# Patient Record
Sex: FEMALE | Race: WHITE | NOT HISPANIC OR LATINO | Employment: FULL TIME | URBAN - METROPOLITAN AREA
[De-identification: names, ages, dates, MRNs, and addresses within clinical notes are randomized per-mention and may not be internally consistent; named-entity substitution may affect disease eponyms.]

---

## 2017-02-01 ENCOUNTER — OFFICE VISIT (OUTPATIENT)
Dept: URGENT CARE | Facility: PHYSICIAN GROUP | Age: 62
End: 2017-02-01
Payer: COMMERCIAL

## 2017-02-01 VITALS
TEMPERATURE: 98.1 F | OXYGEN SATURATION: 98 % | SYSTOLIC BLOOD PRESSURE: 100 MMHG | WEIGHT: 136 LBS | BODY MASS INDEX: 21.96 KG/M2 | DIASTOLIC BLOOD PRESSURE: 58 MMHG | HEART RATE: 68 BPM

## 2017-02-01 DIAGNOSIS — R11.0 NAUSEA IN ADULT: ICD-10-CM

## 2017-02-01 DIAGNOSIS — N30.00 ACUTE CYSTITIS WITHOUT HEMATURIA: ICD-10-CM

## 2017-02-01 LAB
APPEARANCE UR: NORMAL
BILIRUB UR STRIP-MCNC: NORMAL MG/DL
COLOR UR AUTO: YELLOW
GLUCOSE UR STRIP.AUTO-MCNC: NORMAL MG/DL
KETONES UR STRIP.AUTO-MCNC: NORMAL MG/DL
LEUKOCYTE ESTERASE UR QL STRIP.AUTO: NORMAL
NITRITE UR QL STRIP.AUTO: NORMAL
PH UR STRIP.AUTO: 5 [PH] (ref 5–8)
PROT UR QL STRIP: NORMAL MG/DL
RBC UR QL AUTO: NORMAL
SP GR UR STRIP.AUTO: 1.02
UROBILINOGEN UR STRIP-MCNC: NORMAL MG/DL

## 2017-02-01 PROCEDURE — 99203 OFFICE O/P NEW LOW 30 MIN: CPT | Performed by: NURSE PRACTITIONER

## 2017-02-01 PROCEDURE — 81002 URINALYSIS NONAUTO W/O SCOPE: CPT | Performed by: NURSE PRACTITIONER

## 2017-02-01 RX ORDER — ONDANSETRON 4 MG/1
4 TABLET, FILM COATED ORAL EVERY 6 HOURS PRN
Qty: 12 TAB | Refills: 0 | Status: SHIPPED | OUTPATIENT
Start: 2017-02-01

## 2017-02-01 RX ORDER — NITROFURANTOIN 25; 75 MG/1; MG/1
100 CAPSULE ORAL 2 TIMES DAILY
Qty: 14 CAP | Refills: 0 | Status: SHIPPED | OUTPATIENT
Start: 2017-02-01

## 2017-02-01 RX ORDER — TIMOLOL MALEATE 2.5 MG/ML
1 SOLUTION OPHTHALMIC DAILY
COMMUNITY

## 2017-02-01 ASSESSMENT — ENCOUNTER SYMPTOMS
BACK PAIN: 0
WEAKNESS: 0
FEVER: 0
CHILLS: 0
SORE THROAT: 0
MYALGIAS: 0
ABDOMINAL PAIN: 1
HEADACHES: 0
VOMITING: 0
FLANK PAIN: 0
COUGH: 0
NAUSEA: 1

## 2017-02-01 NOTE — PATIENT INSTRUCTIONS
Urinary Tract Infection  Urinary tract infections (UTIs) can develop anywhere along your urinary tract. Your urinary tract is your body's drainage system for removing wastes and extra water. Your urinary tract includes two kidneys, two ureters, a bladder, and a urethra. Your kidneys are a pair of bean-shaped organs. Each kidney is about the size of your fist. They are located below your ribs, one on each side of your spine.  CAUSES  Infections are caused by microbes, which are microscopic organisms, including fungi, viruses, and bacteria. These organisms are so small that they can only be seen through a microscope. Bacteria are the microbes that most commonly cause UTIs.  SYMPTOMS   Symptoms of UTIs may vary by age and gender of the patient and by the location of the infection. Symptoms in young women typically include a frequent and intense urge to urinate and a painful, burning feeling in the bladder or urethra during urination. Older women and men are more likely to be tired, shaky, and weak and have muscle aches and abdominal pain. A fever may mean the infection is in your kidneys. Other symptoms of a kidney infection include pain in your back or sides below the ribs, nausea, and vomiting.  DIAGNOSIS  To diagnose a UTI, your caregiver will ask you about your symptoms. Your caregiver also will ask to provide a urine sample. The urine sample will be tested for bacteria and white blood cells. White blood cells are made by your body to help fight infection.  TREATMENT   Typically, UTIs can be treated with medication. Because most UTIs are caused by a bacterial infection, they usually can be treated with the use of antibiotics. The choice of antibiotic and length of treatment depend on your symptoms and the type of bacteria causing your infection.  HOME CARE INSTRUCTIONS  · If you were prescribed antibiotics, take them exactly as your caregiver instructs you. Finish the medication even if you feel better after you  have only taken some of the medication.  · Drink enough water and fluids to keep your urine clear or pale yellow.  · Avoid caffeine, tea, and carbonated beverages. They tend to irritate your bladder.  · Empty your bladder often. Avoid holding urine for long periods of time.  · Empty your bladder before and after sexual intercourse.  · After a bowel movement, women should cleanse from front to back. Use each tissue only once.  SEEK MEDICAL CARE IF:   · You have back pain.  · You develop a fever.  · Your symptoms do not begin to resolve within 3 days.  SEEK IMMEDIATE MEDICAL CARE IF:   · You have severe back pain or lower abdominal pain.  · You develop chills.  · You have nausea or vomiting.  · You have continued burning or discomfort with urination.  MAKE SURE YOU:   · Understand these instructions.  · Will watch your condition.  · Will get help right away if you are not doing well or get worse.     This information is not intended to replace advice given to you by your health care provider. Make sure you discuss any questions you have with your health care provider.     Document Released: 09/27/2006 Document Revised: 01/08/2016 Document Reviewed: 01/25/2013  [employer] Interactive Patient Education ©2016 [employer].      Nausea, Adult  Nausea is the feeling that you have an upset stomach or have to vomit. Nausea by itself is not likely a serious concern, but it may be an early sign of more serious medical problems. As nausea gets worse, it can lead to vomiting. If vomiting develops, there is the risk of dehydration.   CAUSES   · Viral infections.  · Food poisoning.  · Medicines.  · Pregnancy.  · Motion sickness.  · Migraine headaches.  · Emotional distress.  · Severe pain from any source.  · Alcohol intoxication.  HOME CARE INSTRUCTIONS  · Get plenty of rest.  · Ask your caregiver about specific rehydration instructions.  · Eat small amounts of food and sip liquids more often.  · Take all medicines as told by your  caregiver.  SEEK MEDICAL CARE IF:  · You have not improved after 2 days, or you get worse.  · You have a headache.  SEEK IMMEDIATE MEDICAL CARE IF:   · You have a fever.  · You faint.  · You keep vomiting or have blood in your vomit.  · You are extremely weak or dehydrated.  · You have dark or bloody stools.  · You have severe chest or abdominal pain.  MAKE SURE YOU:  · Understand these instructions.  · Will watch your condition.  · Will get help right away if you are not doing well or get worse.     This information is not intended to replace advice given to you by your health care provider. Make sure you discuss any questions you have with your health care provider.     Document Released: 01/25/2006 Document Revised: 01/08/2016 Document Reviewed: 08/29/2012  [employer] Interactive Patient Education ©2016 [employer]

## 2017-02-01 NOTE — PROGRESS NOTES
Subjective:      Ngoc Carranza is a 61 y.o. female who presents with Nausea            HPI Comments: Medications, Allergies and Prior Medical Hx reviewed and updated in [facility].with patient/family today     Pt with onset 5 days ago of nausea and abd cramping increasing with eating.  Denies vomiting, diarrhea or constipation.  Denies fever or chills.  Pt also c/o frequency, urgency, cloudy and malodorus urine    Nausea  This is a new problem. The current episode started in the past 7 days (5 days). The problem has been unchanged. Associated symptoms include abdominal pain and nausea. Pertinent negatives include no chills, congestion, coughing, fever, headaches, myalgias, sore throat, vomiting or weakness. The symptoms are aggravated by eating. She has tried nothing for the symptoms. The treatment provided no relief.       Review of Systems   Constitutional: Negative for fever, chills and malaise/fatigue.   HENT: Negative for congestion and sore throat.    Respiratory: Negative for cough.    Gastrointestinal: Positive for nausea and abdominal pain. Negative for vomiting.   Genitourinary: Positive for urgency and frequency. Negative for dysuria, hematuria and flank pain.   Musculoskeletal: Negative for myalgias and back pain.   Neurological: Negative for weakness and headaches.          Objective:     /58 mmHg  Pulse 68  Temp(Src) 36.7 °C (98.1 °F)  Wt 61.689 kg (136 lb)  SpO2 98%     Physical Exam   Constitutional: She appears well-developed and well-nourished. No distress.   HENT:   Head: Normocephalic and atraumatic.   Eyes: Conjunctivae are normal. Pupils are equal, round, and reactive to light.   Neck: Neck supple.   Cardiovascular: Normal rate, regular rhythm and normal heart sounds.    Pulmonary/Chest: Effort normal and breath sounds normal. No respiratory distress.   Abdominal: Soft. Bowel sounds are normal. She exhibits no distension and no fluid wave. There is tenderness. There is no rigidity,  no guarding, no CVA tenderness, no tenderness at McBurney's point and negative Jaimes's sign.   Diffuse tenderness mainly in lower abd.    Musculoskeletal: She exhibits no edema.   Neurological: She is alert.   Awake, alert, answering questions appropriately, moving all extremeties   Skin: Skin is warm and dry.   Psychiatric: She has a normal mood and affect. Her behavior is normal.   Vitals reviewed.              Assessment/Plan:       1. Nausea in adult  ondansetron (ZOFRAN) 4 MG Tab tablet   2. Acute cystitis without hematuria  nitrofurantoin monohydr macro (MACROBID) 100 MG Cap           Poct UA positive for leuks and blood    Drink fluids  Discussed with pt will start with treating uti, if abd sx do not improve in the next 2-3 days or worsen to return for recheck.   Pt will go to the ER for worsening or changing symptoms as discusse, high fever, body aches, increasing abd pain  vomiting, flank pain  Follow-up with your primary care provider or return here if not improving in 3 days   Discharge instructions discussed with pt/family who verbalize understanding and agreement with poc

## 2019-03-19 ENCOUNTER — OFFICE VISIT (OUTPATIENT)
Dept: URGENT CARE | Facility: PHYSICIAN GROUP | Age: 64
End: 2019-03-19
Payer: COMMERCIAL

## 2019-03-19 VITALS
TEMPERATURE: 98.1 F | SYSTOLIC BLOOD PRESSURE: 104 MMHG | RESPIRATION RATE: 16 BRPM | BODY MASS INDEX: 22.98 KG/M2 | HEIGHT: 66 IN | HEART RATE: 68 BPM | OXYGEN SATURATION: 97 % | DIASTOLIC BLOOD PRESSURE: 62 MMHG | WEIGHT: 143 LBS

## 2019-03-19 DIAGNOSIS — H61.22 IMPACTED CERUMEN OF LEFT EAR: ICD-10-CM

## 2019-03-19 PROCEDURE — 69210 REMOVE IMPACTED EAR WAX UNI: CPT | Performed by: FAMILY MEDICINE

## 2019-07-16 ENCOUNTER — HOSPITAL ENCOUNTER (OUTPATIENT)
Facility: MEDICAL CENTER | Age: 64
End: 2019-07-16
Attending: OBSTETRICS & GYNECOLOGY
Payer: COMMERCIAL

## 2019-07-16 ENCOUNTER — GYNECOLOGY VISIT (OUTPATIENT)
Dept: OBGYN | Facility: CLINIC | Age: 64
End: 2019-07-16
Payer: COMMERCIAL

## 2019-07-16 VITALS — DIASTOLIC BLOOD PRESSURE: 60 MMHG | BODY MASS INDEX: 23.73 KG/M2 | SYSTOLIC BLOOD PRESSURE: 100 MMHG | WEIGHT: 147 LBS

## 2019-07-16 DIAGNOSIS — Z12.39 BREAST CANCER SCREENING: ICD-10-CM

## 2019-07-16 DIAGNOSIS — Z12.4 CERVICAL CANCER SCREENING: ICD-10-CM

## 2019-07-16 DIAGNOSIS — N39.46 MIXED STRESS AND URGE URINARY INCONTINENCE: ICD-10-CM

## 2019-07-16 DIAGNOSIS — Z01.419 ENCNTR FOR GYN EXAM (GENERAL) (ROUTINE) W/O ABN FINDINGS: ICD-10-CM

## 2019-07-16 DIAGNOSIS — Z78.0 POSTMENOPAUSAL: ICD-10-CM

## 2019-07-16 LAB
APPEARANCE UR: ABNORMAL
BILIRUB UR STRIP-MCNC: ABNORMAL MG/DL
COLOR UR AUTO: ABNORMAL
GLUCOSE UR STRIP.AUTO-MCNC: NEGATIVE MG/DL
KETONES UR STRIP.AUTO-MCNC: NEGATIVE MG/DL
LEUKOCYTE ESTERASE UR QL STRIP.AUTO: ABNORMAL
NITRITE UR QL STRIP.AUTO: NEGATIVE
PH UR STRIP.AUTO: 8.5 [PH] (ref 5–8)
PROT UR QL STRIP: NEGATIVE MG/DL
RBC UR QL AUTO: NEGATIVE
SP GR UR STRIP.AUTO: 1.01
UROBILINOGEN UR STRIP-MCNC: ABNORMAL MG/DL

## 2019-07-16 PROCEDURE — 87077 CULTURE AEROBIC IDENTIFY: CPT

## 2019-07-16 PROCEDURE — 87186 SC STD MICRODIL/AGAR DIL: CPT

## 2019-07-16 PROCEDURE — 99386 PREV VISIT NEW AGE 40-64: CPT | Performed by: OBSTETRICS & GYNECOLOGY

## 2019-07-16 PROCEDURE — 81002 URINALYSIS NONAUTO W/O SCOPE: CPT | Performed by: OBSTETRICS & GYNECOLOGY

## 2019-07-16 PROCEDURE — 87086 URINE CULTURE/COLONY COUNT: CPT

## 2019-07-16 PROCEDURE — 88142 CYTOPATH C/V THIN LAYER: CPT

## 2019-07-16 RX ORDER — ZINC SULFATE 50(220)MG
220 CAPSULE ORAL DAILY
COMMUNITY

## 2019-07-16 NOTE — PROGRESS NOTES
Subjective:      Ngoc Carranza is a 64 y.o. female who presents for Annual Exam            HPI patient is a 64-year-old  0 who presents today for annual gynecologic exam.  Patient reports symptoms of stress and urge incontinence-predominantly urgency.  She reports rare stress incontinence symptoms.  She also reports very malodorous urine for the past few months.  Patient states 1 of her thyroid medication was changed and she has noticed a change in smell of her urine since.  She denies any pelvic pain vaginal bleeding or discharge.    Patient had history of colon cancer and underwent colon resection reportedly 18 cm resection along with 6 months of chemotherapy in .  She undergoes routine colon cancer evaluation and colonoscopy.    Patient reports no history of abnormal Pap smears PID or STDs.  Last Pap smear was more than 10 years ago.  Patient states she had normal mammogram to her PCP earlier this year.    Patient has no other concerns currently.  She cannot recall when she went to menopause.  She has no menopausal symptoms.  No history of HRT    ROS all organ systems were reviewed and were negative except for complaints in HPI       Objective:     /60   Wt 66.7 kg (147 lb)   BMI 23.73 kg/m²      Physical Exam   Constitutional: She is oriented to person, place, and time. She appears well-developed and well-nourished.   HENT:   Head: Normocephalic and atraumatic.   Eyes: Pupils are equal, round, and reactive to light. Conjunctivae are normal. Right eye exhibits no discharge. Left eye exhibits no discharge.   Neck: Normal range of motion. Neck supple. No tracheal deviation present. No thyromegaly present.   Cardiovascular: Normal rate, regular rhythm and normal heart sounds.    Pulmonary/Chest: Effort normal and breath sounds normal. No respiratory distress. She has no wheezes. Right breast exhibits no inverted nipple, no mass, no nipple discharge, no skin change and no tenderness. Left  breast exhibits no inverted nipple, no mass, no nipple discharge, no skin change and no tenderness.   Abdominal: Soft. Bowel sounds are normal. She exhibits no distension. There is no tenderness. There is no guarding.   Old vertical abdominal scar noted   Genitourinary: There is no rash, tenderness, lesion or injury on the right labia. There is no rash, tenderness, lesion or injury on the left labia. Uterus is not enlarged and not tender. Cervix exhibits no motion tenderness, no discharge and no friability. Right adnexum displays no mass, no tenderness and no fullness. Left adnexum displays no mass, no tenderness and no fullness.   Musculoskeletal: Normal range of motion. She exhibits no edema or deformity.   Neurological: She is alert and oriented to person, place, and time. No cranial nerve deficit. Coordination normal.   Skin: Skin is warm and dry. No rash noted. She is not diaphoretic. No erythema.   Psychiatric: She has a normal mood and affect. Her behavior is normal. Thought content normal.   Nursing note and vitals reviewed.              Assessment/Plan:     1. Encntr for gyn exam (general) (routine) w/o abn findings  64-year-old postmenopausal female here for annual gynecologic exam.  Exam is within normal limits    2. Postmenopausal  Patient is asymptomatic    3. Mixed stress and urge urinary incontinence  We discussed symptoms and treatment options including pelvic floor therapy and medication.  We discussed behavioral modification.  Patient desires to try pelvic floor therapy if her urine culture is negative.  Referral sent    4. Cervical cancer screening  Pap smear obtained.  Screening recommendations discussed  - THINPREP PAP WITH HPV; Future    5. Breast cancer screening  Breast cancer screening recommendations reviewed including clinical breast exams, self breast exams and annual mammograms.  Patient is up-to-date with mammogram    Precautions and plan of care reviewed

## 2019-07-18 DIAGNOSIS — N30.90 CYSTITIS: ICD-10-CM

## 2019-07-18 LAB
BACTERIA UR CULT: ABNORMAL
BACTERIA UR CULT: ABNORMAL
SIGNIFICANT IND 70042: ABNORMAL
SITE SITE: ABNORMAL
SOURCE SOURCE: ABNORMAL

## 2019-07-18 RX ORDER — CIPROFLOXACIN 500 MG/1
500 TABLET, FILM COATED ORAL 2 TIMES DAILY
Qty: 10 TAB | Refills: 0 | Status: SHIPPED | OUTPATIENT
Start: 2019-07-18

## 2019-07-22 ENCOUNTER — TELEPHONE (OUTPATIENT)
Dept: OBGYN | Facility: CLINIC | Age: 64
End: 2019-07-22

## 2019-07-22 NOTE — TELEPHONE ENCOUNTER
----- Message from Ildefonso Petersen M.D. sent at 7/18/2019 11:12 AM PDT -----  -.Patient has a bladder infection. please inform her of results.  prescription for Cipro sent to her pharmacy to be taken twice daily for 5 days    7/22/19 0826 Spoke with patient and notified her of above, pt stated she picked up Rx a couple of days ago and has been feeling better

## 2019-07-27 LAB — TEST NAME 95000: NORMAL

## 2020-03-25 ENCOUNTER — OFFICE VISIT (OUTPATIENT)
Dept: URGENT CARE | Facility: CLINIC | Age: 65
End: 2020-03-25
Payer: COMMERCIAL

## 2020-03-25 VITALS
HEIGHT: 66 IN | RESPIRATION RATE: 16 BRPM | DIASTOLIC BLOOD PRESSURE: 76 MMHG | BODY MASS INDEX: 23.95 KG/M2 | OXYGEN SATURATION: 97 % | SYSTOLIC BLOOD PRESSURE: 122 MMHG | HEART RATE: 53 BPM | TEMPERATURE: 97.6 F | WEIGHT: 149 LBS

## 2020-03-25 DIAGNOSIS — J06.9 UPPER RESPIRATORY TRACT INFECTION, UNSPECIFIED TYPE: ICD-10-CM

## 2020-03-25 PROCEDURE — 99214 OFFICE O/P EST MOD 30 MIN: CPT | Performed by: PHYSICIAN ASSISTANT

## 2020-03-25 ASSESSMENT — ENCOUNTER SYMPTOMS
HEADACHES: 1
CHILLS: 0
NAUSEA: 0
DIZZINESS: 0
FEVER: 0
VOMITING: 0
ABDOMINAL PAIN: 0
DIARRHEA: 0

## 2020-03-25 NOTE — PROGRESS NOTES
Subjective:      Ngoc Carranza is a 64 y.o. female who presents with Cough (sore throat, running nose, x late monday night ) and Letter for School/Work (called in scik yesterday and today needs clearance)            HPI  64-year-old female presents to urgent care with a new problem of cough, sore throat, congestion 2 days ago.  Patient reports associated nasal drainage, generalized fatigue and mild headache.  Denies known sick contacts or recent travel. Denies fevers.   Denies other associated aggravating or alleviating factors.     Review of Systems   Constitutional: Negative for chills, fever and malaise/fatigue.   HENT: Positive for congestion, sinus pain and sore throat. Negative for ear pain.    Eyes: Negative for pain and redness.   Respiratory: Positive for cough. Negative for sputum production, shortness of breath and wheezing.    Cardiovascular: Negative for chest pain and palpitations.   Gastrointestinal: Negative for abdominal pain, diarrhea, nausea and vomiting.   Genitourinary: Negative for dysuria.   Musculoskeletal: Positive for myalgias. Negative for neck pain.   Skin: Negative for rash.   Neurological: Positive for headaches. Negative for dizziness.   Endo/Heme/Allergies: Negative for environmental allergies.      Past Medical History:   Diagnosis Date   • Anemia    • Cancer (HCC) 2007    colon   • CATARACT    • Glaucoma     currently no medication   • Hepatitis C    • Indigestion    • Pain     left hip   • Unspecified disorder of thyroid     partial thyroidecomty for nodules     Medications and allergies reviewed per [facility].     Social History     Tobacco Use   • Smoking status: Former Smoker     Packs/day: 0.50     Years: 6.00     Pack years: 3.00     Types: Cigarettes     Last attempt to quit: 1980     Years since quittin.2   • Smokeless tobacco: Never Used   Substance Use Topics   • Alcohol use: Yes     Comment: occ      Objective:     /76   Pulse (!) 53   Temp 36.4 °C  "(97.6 °F) (Temporal)   Resp 16   Ht 1.676 m (5' 6\")   Wt 67.6 kg (149 lb)   SpO2 97%   BMI 24.05 kg/m²      Physical Exam  Vitals signs reviewed.   Constitutional:       General: She is not in acute distress.     Appearance: Normal appearance. She is well-developed. She is not ill-appearing.   HENT:      Head: Normocephalic and atraumatic.      Right Ear: Tympanic membrane normal.      Left Ear: Tympanic membrane normal.      Nose: Mucosal edema and congestion present.      Mouth/Throat:      Mouth: Mucous membranes are moist.      Pharynx: Oropharynx is clear. Posterior oropharyngeal erythema present.   Neck:      Musculoskeletal: Normal range of motion and neck supple.   Cardiovascular:      Rate and Rhythm: Normal rate and regular rhythm.      Heart sounds: Normal heart sounds.   Pulmonary:      Effort: Pulmonary effort is normal. No respiratory distress.      Breath sounds: Normal breath sounds.   Abdominal:      General: Bowel sounds are normal. There is no distension.      Palpations: Abdomen is soft.   Musculoskeletal: Normal range of motion.   Skin:     General: Skin is warm and dry.   Neurological:      General: No focal deficit present.      Mental Status: She is alert and oriented to person, place, and time.   Psychiatric:         Mood and Affect: Mood normal.         Behavior: Behavior normal.         Thought Content: Thought content normal.         Judgment: Judgment normal.                 Assessment/Plan:     1. Upper respiratory tract infection, unspecified type       Informed patient that in accordance with guidance from the Centers for Disease Control and Prevention (CDC), employer groups are encouraged to not require a doctor's note for employees who are sick with respiratory illness. [facility] and [facility] personnel will not be providing these notes for the foreseeable future.      Advised patient symptoms are most likely viral in etiology. Increased " fluids and rest. Discussed use of OTC cough and cold medication and Tylenol/Motrin for symptomatic relief.  Return for reevaluation or follow-up with PCP if symptoms persist or worsen. Supportive care, differential diagnoses, and indications for immediate follow-up discussed with patient.   Pathogenesis of diagnosis discussed including typical length and natural progression.  The patient demonstrated a good understanding and agreed with the treatment plan. Patient verbalized understanding of treatment plan and has no further questions regarding care.   Vital signs stable. Good hand hygiene and respiratory precautions. Otherwise, patient instructed to self isolate/ quarantine. Discharge handout given.    Differential diagnosis, natural history, supportive care, and indications for immediate follow-up discussed.    Discussed with patient at length importance of communal effort to help decrease the infection rate of COVID 19. Patient advised to avoid large gatherings of people and practice good hand hygiene and respiratory precautions.

## 2020-03-26 ASSESSMENT — ENCOUNTER SYMPTOMS
WHEEZING: 0
MYALGIAS: 1
PALPITATIONS: 0
NECK PAIN: 0
COUGH: 1
SORE THROAT: 1
SPUTUM PRODUCTION: 0
EYE REDNESS: 0
SINUS PAIN: 1
SHORTNESS OF BREATH: 0
EYE PAIN: 0

## 2021-03-03 DIAGNOSIS — Z23 NEED FOR VACCINATION: ICD-10-CM

## 2024-10-29 ENCOUNTER — OFFICE VISIT (OUTPATIENT)
Dept: ENDOCRINOLOGY | Facility: MEDICAL CENTER | Age: 69
End: 2024-10-29
Attending: INTERNAL MEDICINE
Payer: MEDICARE

## 2024-10-29 VITALS
DIASTOLIC BLOOD PRESSURE: 69 MMHG | HEART RATE: 56 BPM | OXYGEN SATURATION: 98 % | WEIGHT: 141 LBS | BODY MASS INDEX: 22.66 KG/M2 | SYSTOLIC BLOOD PRESSURE: 107 MMHG | HEIGHT: 66 IN

## 2024-10-29 DIAGNOSIS — E89.0 POST-SURGICAL HYPOTHYROIDISM: ICD-10-CM

## 2024-10-29 DIAGNOSIS — E04.2 MULTINODULAR GOITER: ICD-10-CM

## 2024-10-29 PROCEDURE — 3078F DIAST BP <80 MM HG: CPT | Performed by: INTERNAL MEDICINE

## 2024-10-29 PROCEDURE — 3074F SYST BP LT 130 MM HG: CPT | Performed by: INTERNAL MEDICINE

## 2024-10-29 PROCEDURE — 99214 OFFICE O/P EST MOD 30 MIN: CPT | Performed by: INTERNAL MEDICINE

## 2024-10-29 PROCEDURE — 99211 OFF/OP EST MAY X REQ PHY/QHP: CPT | Performed by: INTERNAL MEDICINE

## 2024-10-29 RX ORDER — LEVOTHYROXINE SODIUM 137 UG/1
137 TABLET ORAL
COMMUNITY
Start: 2024-10-15 | End: 2024-11-02 | Stop reason: SDUPTHER

## 2024-11-02 DIAGNOSIS — E89.0 POSTSURGICAL HYPOTHYROIDISM: ICD-10-CM

## 2024-11-02 RX ORDER — LEVOTHYROXINE SODIUM 137 UG/1
137 TABLET ORAL
Qty: 90 TABLET | Refills: 2 | Status: SHIPPED | OUTPATIENT
Start: 2024-11-02 | End: 2025-07-30

## 2024-11-02 NOTE — PROGRESS NOTES
New Patient Note for Endocrinology    Referred by: Ella Munguia P.A.-C.    Ngoc Carranza is a 69 y.o. female who presents today as a new patient with the following Chief Complaint(s): Follow up for Diagnoses of Post-surgical hypothyroidism and Multinodular goiter were pertinent to this visit.    HPI:  Patient is a 69-year-old female with a history of postsurgical hypothyroidism (for partial thyroidectomy) and multinodular goiter in remaining lobe.  In March 2009 patient had left lobectomy which showed a follicular adenoma and extensive Hashimoto's but no malignancy.  Patient does report nonspecific symptoms including hair loss, weight loss, and always being hungry.    8/2010 thyroid ultrasound-previous 1.8 cm right thyroid nodule not visualized, but 8 mm nodule on the right is seen.  Status post left lobe thyroidectomy.  8/2023 thyroid ultrasound-right nodule 1 (mid inferior) 1.4 x 1.2 x 0.9 cm TI-RADS 3; right nodule 2 (posterior mid) 0.6 x 0.6 x 0.3 cm TI-RADS 0; surgically absent left thyroid lobe    5/2024 TSH 1.67, free T41.86 On LT4 137 5 days/week and one half tab 2 days/week        ROS:  Per HPI, otherwise: Negative  General: Denies fever/chills, weight loss, fatigue, recent illness, weakness  Skin:  Denies rashes, lesions  HEENT: Denies sore throat  Resp:  Denies SOB, dypsnea on exertion  CV:  Denies chest pain, palpitations, diaphoresis  GI:  Denies nausea/vomiting, melena, PUD  :  Denies dysuria, urgency, frequency, hematuria  MS:  Denies other joint pain, myalgias\  Endo:  Denies osteoporosis    Past Medical History:  Patient Active Problem List    Diagnosis Date Noted    Osteoarthrosis, unspecified whether generalized or localized, pelvic region and thigh 11/05/2012       Past Surgical History:  Past Surgical History:   Procedure Laterality Date    HIP ARTHROPLASTY TOTAL  11/5/2012    Performed by Marky Mahmood M.D. at [facility] ORS    THYROIDECTOMY  3/25/2009     Performed by LAURA HOOD at SURGERY [facility] ORS    CATH REMOVAL  08    Performed by DAWOOD ORDONEZ at SURGERY [facility] ORS    COLON RESECTION  2007    HIP ARTHROSCOPY  2004    left    LAPAROTOMY  2004    excision cyst    RECONSTRUCTION, KNEE, ACL, ARTHROSCOPIC      right    RECONSTRUCTION, KNEE, ACL, ARTHROSCOPIC      left       Allergies:  Nkda [no known drug allergy]    Social History:  Social History     Socioeconomic History    Marital status: Single     Spouse name: Not on file    Number of children: Not on file    Years of education: Not on file    Highest education level: Not on file   Occupational History    Not on file   Tobacco Use    Smoking status: Former     Current packs/day: 0.00     Average packs/day: 0.5 packs/day for 6.0 years (3.0 ttl pk-yrs)     Types: Cigarettes     Start date: 1974     Quit date: 1980     Years since quittin.8    Smokeless tobacco: Never   Substance and Sexual Activity    Alcohol use: Yes     Comment: occ    Drug use: Yes     Types: Marijuana    Sexual activity: Yes     Partners: Male   Other Topics Concern    Not on file   Social History Narrative    Not on file     Social Determinants of Health     Financial Resource Strain: Not on file   Food Insecurity: Not on file   Transportation Needs: Not on file   Physical Activity: Not on file   Stress: Not on file   Social Connections: Not on file   Intimate Partner Violence: Not on file   Housing Stability: Not on file       Family History:  Family History   Problem Relation Age of Onset    Diabetes Unknown     Hypertension Unknown     Cancer Unknown     Diabetes Mother     Hypertension Mother     Cancer Father        Medications:    Current Outpatient Medications:     levothyroxine (SYNTHROID) 137 MCG Tab, Take 137 mcg by mouth every day. 1 tablet Mon, wed, Friday, sat and sun 1/2 tablet Tue and Thurs, Disp: , Rfl:     timolol gel-forming (TIMOPTIC-XR) 0.25 % GEL FORMING SOLUTION,  "Place 1 Drop in both eyes every day., Disp: , Rfl:     VITAMIN D, CHOLECALCIFEROL, PO, Take 4,000 Units by mouth every day.  , Disp: , Rfl:     Ascorbic Acid (VITAMIN C) 1000 MG TABS, Take  by mouth every day.  , Disp: , Rfl:     Ferrous Sulfate (IRON) 90 (18 FE) MG TABS, Take  by mouth every day.  , Disp: , Rfl:     ibuprofen (MOTRIN) 200 MG TABS, Take 200 mg by mouth every 6 hours as needed.  , Disp: , Rfl:     Probiotic Product (PROBIOTIC FORMULA PO), Take  by mouth every day.  , Disp: , Rfl:     ciprofloxacin (CIPRO) 500 MG Tab, Take 1 Tab by mouth 2 times a day., Disp: 10 Tab, Rfl: 0    zinc sulfate (ZINCATE) 220 (50 Zn) MG Cap, Take 220 mg by mouth every day., Disp: , Rfl:     nitrofurantoin monohydr macro (MACROBID) 100 MG Cap, Take 1 Cap by mouth 2 times a day. (Patient not taking: Reported on 3/19/2019), Disp: 14 Cap, Rfl: 0    ondansetron (ZOFRAN) 4 MG Tab tablet, Take 1 Tab by mouth every 6 hours as needed for Nausea/Vomiting. (Patient not taking: Reported on 3/19/2019), Disp: 12 Tab, Rfl: 0    Oxycodone-Acetaminophen (PERCOCET-10)  MG TABS, Take 1-2 Tabs by mouth every four hours as needed.  , Disp: , Rfl:     Oxycodone-Acetaminophen (PERCOCET-10)  MG TABS, Take 1-2 Tabs by mouth every four hours as needed for Moderate Pain. (Patient not taking: Reported on 3/19/2019), Disp: 15 Each, Rfl: 0    levothyroxine (SYNTHROID) 75 MCG TABS, Take 75 mcg by mouth every day.  , Disp: , Rfl:     Flaxseed, Linseed, 1300 MG CAPS, Take  by mouth every day. Takes two tablets , Disp: , Rfl:     Physical Examination:   Vital signs: /69   Pulse (!) 56   Ht 1.676 m (5' 6\") Comment: 5f6i  Wt 64 kg (141 lb)   SpO2 98%   BMI 22.76 kg/m²   General: No distress, cooperative, well dressed and well nourished.   Eyes: No scleral icterus or discharge  ENMT: Normal on external inspection of nose, lips, No nasal drainage   Neck: Right lobe with 1 palpable nodule approximately 1.5 cm; no palpable left lobe   resp: " Normal effort  Neuro: Alert and oriented  Psych: Normal mood and affect      Assessment and Plan:    1. Post-surgical hypothyroidism  Goal TSH 0.5-4  5/2024 TSH 1.67  No changes  Follow-up in 6 months with labs-May consider T3 in the future if patient remains symptomatic    - TSH+FREE T4  - US-THYROID; Future    2. Multinodular goiter  Repeat ultrasound prior to follow-up in 6 months    - TSH+FREE T4  - US-THYROID; Future        Nghia Wall M.D.

## 2025-04-14 ENCOUNTER — TELEPHONE (OUTPATIENT)
Dept: ENDOCRINOLOGY | Facility: MEDICAL CENTER | Age: 70
End: 2025-04-14
Payer: COMMERCIAL

## 2025-04-22 NOTE — TELEPHONE ENCOUNTER
Called and left a voicemail to confirm with patient if she has completed labs and US. Requested to see if she can have records of US and lab results faxed to our office as we currently do not have any results on file.

## 2025-05-17 LAB
T4 FREE SERPL-MCNC: 1.63 NG/DL (ref 0.82–1.77)
TSH SERPL DL<=0.005 MIU/L-ACNC: 1.39 UIU/ML (ref 0.45–4.5)

## 2025-05-20 ENCOUNTER — OFFICE VISIT (OUTPATIENT)
Dept: ENDOCRINOLOGY | Facility: MEDICAL CENTER | Age: 70
End: 2025-05-20
Attending: INTERNAL MEDICINE
Payer: MEDICARE

## 2025-05-20 VITALS
SYSTOLIC BLOOD PRESSURE: 104 MMHG | DIASTOLIC BLOOD PRESSURE: 62 MMHG | WEIGHT: 137.2 LBS | HEIGHT: 66 IN | OXYGEN SATURATION: 100 % | BODY MASS INDEX: 22.05 KG/M2 | HEART RATE: 54 BPM

## 2025-05-20 DIAGNOSIS — E89.0 POSTSURGICAL HYPOTHYROIDISM: Primary | ICD-10-CM

## 2025-05-20 DIAGNOSIS — E04.2 MULTINODULAR GOITER: ICD-10-CM

## 2025-05-20 PROCEDURE — 99212 OFFICE O/P EST SF 10 MIN: CPT | Performed by: INTERNAL MEDICINE

## 2025-05-20 RX ORDER — LEVOTHYROXINE SODIUM 137 UG/1
137 TABLET ORAL
Qty: 90 TABLET | Refills: 2 | Status: SHIPPED | OUTPATIENT
Start: 2025-05-20 | End: 2026-02-14

## 2025-05-20 NOTE — PROGRESS NOTES
"  Established Patient    Patient Care Team:  Ella Munguia P.A.-C. as PCP - General (Family Medicine)    Ngoc Carranza is a 69 y.o. female who presents today with the following Chief Complaint(s): Follow up for The primary encounter diagnosis was Postsurgical hypothyroidism. A diagnosis of Multinodular goiter was also pertinent to this visit.    HPI:  Patient is a 69-year-old female with a history of postsurgical hypothyroidism (for partial thyroidectomy) and multinodular goiter in remaining lobe.  In March 2009 patient had left lobectomy which showed a follicular adenoma and extensive Hashimoto's but no malignancy.  Patient does report nonspecific symptoms including hair loss, weight loss, and always being hungry.    8/2010 thyroid ultrasound-previous 1.8 cm right thyroid nodule not visualized, but 8 mm nodule on the right is seen.  Status post left lobe thyroidectomy.  8/2023 thyroid ultrasound-right nodule 1 (mid inferior) 1.4 x 1.2 x 0.9 cm TI-RADS 3; right nodule 2 (posterior mid) 0.6 x 0.6 x 0.3 cm TI-RADS 0; surgically absent left thyroid lobe    5/2024 TSH 1.67, free T41.86 On LT4 137 5 days/week and one half tab 2 days/week    5/16/25 tsh 1.39, ft4 1.63 (on lt4 137 6/wk)    ROS:  Per HPI, otherwise: Negative    Past Medical History[1]  Social History[2]  Current Medications[3]    /62   Pulse (!) 54   Ht 1.676 m (5' 6\") Comment: pt stated  Wt 62.2 kg (137 lb 3.2 oz)   SpO2 100%   BMI 22.14 kg/m²     Physical Exam:   Gen:           Alert and oriented, No apparent distress. Mood and affect appropriate, normal interaction with examiner.   Hearing:     Grossly intact.  Neck:        Supple, trachea midline, no masses, R thyroid lobe absent, L thyroid lobe nodular  Gait and Station: Normal.  Digits and Nails: No clubbing, cyanosis, petechiae, or nodes.   Skin:        No rashes, lesions or ulcers noted.               Ext:           No cyanosis or edema.    Assessment and Plan:     1. " Postsurgical hypothyroidism (Primary)  Goal tsh 0.5-4  Current 1.39  No changes  F/u in 6 months with labs    - TSH; Future  - FREE THYROXINE; Future    2. Multinodular goiter  Recheck u/s prior to f/u    - US-THYROID; Future      Orders Placed This Encounter    US-THYROID    TSH    FREE THYROXINE       No follow-ups on file.    Please note that this dictation was created using voice recognition software. I have made every reasonable attempt to correct obvious errors, but I expect that there are errors of grammar and possibly content that I did not discover before finalizing the note.     Nghia Wall M.D.       [1]   Past Medical History:  Diagnosis Date    Anemia     Cancer (HCC)     colon    CATARACT     Glaucoma     currently no medication    Hepatitis C     Indigestion     Pain     left hip    Unspecified disorder of thyroid     partial thyroidecomty for nodules   [2]   Social History  Tobacco Use    Smoking status: Former     Current packs/day: 0.00     Average packs/day: 0.5 packs/day for 6.0 years (3.0 ttl pk-yrs)     Types: Cigarettes     Start date: 1974     Quit date: 1980     Years since quittin.4    Smokeless tobacco: Never   Substance Use Topics    Alcohol use: Yes     Comment: occ    Drug use: Yes     Types: Marijuana   [3]   Current Outpatient Medications   Medication Sig Dispense Refill    levothyroxine (SYNTHROID) 137 MCG Tab Take 1 Tablet by mouth every day for 270 days. 1 tablet Mon, wed, Friday, sat and sun; 1/2 tablet e and Thurs 90 Tablet 2    timolol gel-forming (TIMOPTIC-XR) 0.25 % GEL FORMING SOLUTION Place 1 Drop in both eyes every day.      VITAMIN D, CHOLECALCIFEROL, PO Take 4,000 Units by mouth every day.        Ascorbic Acid (VITAMIN C) 1000 MG TABS Take  by mouth every day.        Ferrous Sulfate (IRON) 90 (18 FE) MG TABS Take  by mouth every day.        ibuprofen (MOTRIN) 200 MG TABS Take 200 mg by mouth every 6 hours as needed.        Probiotic Product  (PROBIOTIC FORMULA PO) Take  by mouth every day.        ciprofloxacin (CIPRO) 500 MG Tab Take 1 Tab by mouth 2 times a day. 10 Tab 0    zinc sulfate (ZINCATE) 220 (50 Zn) MG Cap Take 220 mg by mouth every day.      nitrofurantoin monohydr macro (MACROBID) 100 MG Cap Take 1 Cap by mouth 2 times a day. (Patient not taking: Reported on 3/19/2019) 14 Cap 0    ondansetron (ZOFRAN) 4 MG Tab tablet Take 1 Tab by mouth every 6 hours as needed for Nausea/Vomiting. (Patient not taking: Reported on 3/19/2019) 12 Tab 0    Oxycodone-Acetaminophen (PERCOCET-10)  MG TABS Take 1-2 Tabs by mouth every four hours as needed.        Oxycodone-Acetaminophen (PERCOCET-10)  MG TABS Take 1-2 Tabs by mouth every four hours as needed for Moderate Pain. (Patient not taking: Reported on 3/19/2019) 15 Each 0    Flaxseed, Linseed, 1300 MG CAPS Take  by mouth every day. Takes two tablets        No current facility-administered medications for this visit.